# Patient Record
Sex: MALE | Employment: OTHER | URBAN - METROPOLITAN AREA
[De-identification: names, ages, dates, MRNs, and addresses within clinical notes are randomized per-mention and may not be internally consistent; named-entity substitution may affect disease eponyms.]

---

## 2018-06-12 ENCOUNTER — HOSPITAL ENCOUNTER (OUTPATIENT)
Dept: PHYSICAL THERAPY | Age: 73
Discharge: HOME | End: 2018-06-12
Payer: MEDICARE

## 2018-06-12 PROCEDURE — G8979 MOBILITY GOAL STATUS: HCPCS | Performed by: PHYSICAL THERAPIST

## 2018-06-12 PROCEDURE — G8980 MOBILITY D/C STATUS: HCPCS | Performed by: PHYSICAL THERAPIST

## 2018-06-12 PROCEDURE — 97162 PT EVAL MOD COMPLEX 30 MIN: CPT | Performed by: PHYSICAL THERAPIST

## 2018-06-12 PROCEDURE — G8978 MOBILITY CURRENT STATUS: HCPCS | Performed by: PHYSICAL THERAPIST

## 2018-06-12 NOTE — PROGRESS NOTES
[place] [id number] [address] PHYSICAL THERAPY   [facility], [place] [address],[facility], [address] [facility] - Phone: [phone]  Fax: [phone] / [phone] [facility]  Patient Name: Johan Chanel : 1945   Medical   Diagnosis: Muscle weakness, difficulty walking  Treatment Diagnosis: Difficulty in walking, not elsewhere classified [R26.2]  Muscle weakness (generalized) [M62.81]   Onset Date: 18     Referral Source: Bree Garcia NP Start of Care [facility]): 2018   Prior Hospitalization: See medical history Provider #: [id number]   Prior Level of Function: Progressive wb pain    Comorbidities: Lower urinary tract symptoms, prostrate asymmetry, thyroid problems   Medications: Verified on Patient Summary List   The Plan of Care and following information is based on the information from the initial evaluation.   ===========================================================================================  Assessment / key information:  81 pleasant male arrives to clinic with daughter with a referral for muscular weakness and difficulty walking. PMH was obtained form both pt and daughter reporting vein removal surgery in both his R UE and L LE due to poor circulation to L foot. UE surgery without complications but daughter states LE required multiple procedures on 18, 18 and 18 with inpatient PT to follow. He is now at home with daughter. Reported seeing PCP yesterday and notes possible incision infection. Upon further examination THERE WAS 1/2\"X3/8\"  SLOUGHY TISSUE DISTAL TO L SCROTUM WITH MODERATE PURULENT AND SEROUS EXUDATE. Pt stated this was cleaned by PCP during his visit with her office yesterday and did not notice the drainage until today.  Also states he was told just to monitor and if sx worse contact md. Remaining incisions appear at normal healing rate following surgery however he is at +2\" circumferential measurements distal to knee. Pedal pulse and reflex testing unremarkable. Reduced light touch along medial LE and both light and sharp discrimination on plantar aspect of foot. Great toe/ankle proprioceptive testing unremarkable. Knee arom flex limited to 90, hip flex <80 due to c/o groin incision pain. At this time remaining of examination was held and pt was advised to contact referring MD and PCP due to this. Will await further consult in order to determine next step in PT. Advised daughter and pt to call our office following this to schedule visits. .   ===========================================================================================  Eval Complexity: History HIGH Complexity :3+ comorbidities / personal factors will impact the outcome/ POC ;  Examination  HIGH Complexity : 4+ Standardized tests and measures addressing body structure, function, activity limitation and / or participation in recreation ; Presentation MEDIUM Complexity : Evolving with changing characteristics ;   Decision Making MEDIUM Complexity : FOTO score of 26-74; Overall Complexity MEDIUM  Problem List: pain affecting function, decrease ROM, decrease strength, edema affecting function, impaired gait/ balance, decrease ADL/ functional abilitiies, decrease activity tolerance, decrease flexibility/ joint mobility and decrease transfer abilities   Treatment Plan may include any combination of the following: Therapeutic exercise, Therapeutic activities, Neuromuscular re-education, Physical agent/modality, Gait/balance training, Manual therapy, Patient education, Self Care training, Functional mobility training, Home safety training and Stair training  Patient / Family readiness to learn indicated by: asking questions, trying to perform skills and interest  Persons(s) to be included in education: patient (P) and family support person (FSP);list daughter  Barriers to Learning/Limitations: yes;  physical  Measures taken: Await md consult to determine PT interventions   Patient Goal (s): Decrease edema in leg,decrease stiffness    Patient self reported health status: good  Rehabilitation Potential: good   Short Term Goals: To be accomplished in  2  weeks:  1. Pt will have f/u with MD to assess wound   2. Pt will be compliant with hep  3. Pt will demonstrate self management of wound care   Long Term Goals: To be accomplished in  4  weeks:  1. Pt will have full closure of proximal wound  2. Pt will increase FOTO by 10 points in order to show functional improvement  3. Pt will increase L hip arom wfl all direction in order to improve functional mobility   Frequency / Duration:   Patient to be seen  3  times per week for 4  weeks:  Patient / Caregiver education and instruction: self care and activity modification  G-Codes (GP): Cristy Ma .Mobility [id number] Current  CK= 40-59%   Goal  CK= 40-59%. The severity rating is based on the FOTO Score    Therapist Signature: Dima Connelly DPT [facility] Date: 0/22/9168   Certification Period: 6/12/18-9/10/18 Time: 11:35 AM   ===========================================================================================  I certify that the above Physical Therapy Services are being furnished while the patient is under my care. I agree with the treatment plan and certify that this therapy is necessary. Physician Signature:        Date:       Time:     Please sign and return to [facility], [facility]. or you may fax the signed copy to [phone]. Thank you.

## 2018-06-12 NOTE — PROGRESS NOTES
[place] PHYSICAL THERAPY - DAILY TREATMENT NOTE    Patient Name: Ludmila Marking        Date: 2018  : 1945   yes Patient  Verified  Visit #:     Insurance: Payor: Shannan English / Plan: [place] PART A & B / Product Type: Medicare /      In time: 900 Out time: 940   Total Treatment Time: 40     Medicare/BCBS Time Tracking (below)   Total Timed Codes (min):  0 1:1 Treatment Time:  0     TREATMENT AREA =  Difficulty in walking, not elsewhere classified [R26.2]  Muscle weakness (generalized) [M62.81]    SUBJECTIVE  Pain Level (on 0 to 10 scale):  0  / 10   Medication Changes/New allergies or changes in medical history, any new surgeries or procedures?    no  If yes, update Summary List   Subjective Functional Status/Changes:  []  No changes reported     See ie          OBJECTIVE     min Patient Education:  no  Reviewed HEP   []  Progressed/Changed HEP based on: Other Objective/Functional Measures:    Pt and daughter advised to immediately contact surgeon and pcp in regards to draining wound. Will address PT including hep following this  See ie     Post Treatment Pain Level (on 0 to 10) scale:   0  / 10     ASSESSMENT  Assessment/Changes in Function:     See ie     []  See Progress Note/Recertification   Patient will continue to benefit from skilled PT services to modify and progress therapeutic interventions, address functional mobility deficits, address ROM deficits, address strength deficits, analyze and address soft tissue restrictions, analyze and cue movement patterns, analyze and modify body mechanics/ergonomics, assess and modify postural abnormalities, address imbalance/dizziness and instruct in home and community integration to attain remaining goals.    Progress toward goals / Updated goals:    See ie     PLAN  []  Upgrade activities as tolerated yes Continue plan of care   []  Discharge due to :    []  Other:      Therapist: Cierra Rich, PT    Date: 2018 Time: 12:06 PM Future Appointments  Date Time Provider Joselo Metzger   7/6/2018 10:30 AM Carlos Ashley MD [address] [facility]

## 2018-06-18 ENCOUNTER — HOSPITAL ENCOUNTER (OUTPATIENT)
Dept: PHYSICAL THERAPY | Age: 73
End: 2018-06-18
Payer: MEDICARE

## 2018-06-20 ENCOUNTER — APPOINTMENT (OUTPATIENT)
Dept: PHYSICAL THERAPY | Age: 73
End: 2018-06-20
Payer: MEDICARE

## 2018-06-22 ENCOUNTER — APPOINTMENT (OUTPATIENT)
Dept: PHYSICAL THERAPY | Age: 73
End: 2018-06-22
Payer: MEDICARE

## 2018-06-25 ENCOUNTER — APPOINTMENT (OUTPATIENT)
Dept: PHYSICAL THERAPY | Age: 73
End: 2018-06-25
Payer: MEDICARE

## 2018-06-27 ENCOUNTER — APPOINTMENT (OUTPATIENT)
Dept: PHYSICAL THERAPY | Age: 73
End: 2018-06-27
Payer: MEDICARE

## 2018-06-29 ENCOUNTER — APPOINTMENT (OUTPATIENT)
Dept: PHYSICAL THERAPY | Age: 73
End: 2018-06-29
Payer: MEDICARE

## 2018-07-02 ENCOUNTER — APPOINTMENT (OUTPATIENT)
Dept: PHYSICAL THERAPY | Age: 73
End: 2018-07-02

## 2018-07-03 ENCOUNTER — APPOINTMENT (OUTPATIENT)
Dept: PHYSICAL THERAPY | Age: 73
End: 2018-07-03

## 2018-07-06 ENCOUNTER — APPOINTMENT (OUTPATIENT)
Dept: PHYSICAL THERAPY | Age: 73
End: 2018-07-06

## 2018-07-09 ENCOUNTER — APPOINTMENT (OUTPATIENT)
Dept: PHYSICAL THERAPY | Age: 73
End: 2018-07-09

## 2018-07-11 ENCOUNTER — APPOINTMENT (OUTPATIENT)
Dept: PHYSICAL THERAPY | Age: 73
End: 2018-07-11

## 2018-07-13 ENCOUNTER — APPOINTMENT (OUTPATIENT)
Dept: PHYSICAL THERAPY | Age: 73
End: 2018-07-13

## 2018-08-28 NOTE — PROGRESS NOTES
. 
 
[facility] [address] [facility] THERAPY 
[address] [place], [place] [address],[facility], [address] [facility] [address] - Phone: [phone]  Fax: [phone] DISCHARGE NOTE Patient Name: Willis Chopra : 1945 Treatment/Medical Diagnosis: Muscle weakness (generalized) [M62.81] Other abnormalities of gait and mobility [R26.89] Referral Source: Saintclair Sly, NP Date of Initial Visit: 18 Attended Visits: 1 Missed Visits: See below SUMMARY OF TREATMENT Pt attended only initial evaluation and called to cancel his remaining appointments reporting being hospitalized for wound care. He did not return and therefore a formal reassessment of goals was not performed. RECOMMENDATIONS Discontinue physical therapy due to patient not returning. If you have any questions/comments please contact us directly at [phone]. Thank you for allowing us to assist in the care of your patient. Therapist Signature: Babs Mariscal PT Date: 18 Reporting period 18-18 Time: 2:26 PM